# Patient Record
Sex: FEMALE | Race: WHITE | Employment: FULL TIME | URBAN - METROPOLITAN AREA
[De-identification: names, ages, dates, MRNs, and addresses within clinical notes are randomized per-mention and may not be internally consistent; named-entity substitution may affect disease eponyms.]

---
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
## 2023-01-12 ENCOUNTER — NURSE ONLY (OUTPATIENT)
Dept: FAMILY MEDICINE CLINIC | Age: 18
End: 2023-01-12

## 2023-01-12 DIAGNOSIS — Z30.42 ENCOUNTER FOR SURVEILLANCE OF INJECTABLE CONTRACEPTIVE: Primary | ICD-10-CM

## 2023-01-12 LAB
CONTROL: POSITIVE
PREGNANCY TEST URINE, POC: NEGATIVE

## 2023-01-12 RX ORDER — MEDROXYPROGESTERONE ACETATE 150 MG/ML
150 INJECTION, SUSPENSION INTRAMUSCULAR ONCE
Status: COMPLETED | OUTPATIENT
Start: 2023-01-12 | End: 2023-01-12

## 2023-01-12 RX ADMIN — MEDROXYPROGESTERONE ACETATE 150 MG: 150 INJECTION, SUSPENSION INTRAMUSCULAR at 11:01

## 2023-01-12 NOTE — PROGRESS NOTES
After obtaining consent, and per orders of Dima Delacruz PAC, injection of [id number] mg given in Right upper quad. gluteus by Jennifer Mejía MA. Patient instructed to remain in clinic for 20 minutes afterwards, and to report any adverse reaction to me immediately.

## 2023-05-21 RX ORDER — ESCITALOPRAM OXALATE 10 MG/1
TABLET ORAL
Qty: 135 TABLET | Refills: 0 | Status: SHIPPED | OUTPATIENT
Start: 2023-05-21

## 2023-07-27 ENCOUNTER — OFFICE VISIT (OUTPATIENT)
Dept: FAMILY MEDICINE CLINIC | Age: 18
End: 2023-07-27

## 2023-07-27 VITALS
BODY MASS INDEX: 27.74 KG/M2 | TEMPERATURE: 97.2 F | DIASTOLIC BLOOD PRESSURE: 82 MMHG | HEART RATE: 114 BPM | WEIGHT: 183 LBS | SYSTOLIC BLOOD PRESSURE: 122 MMHG | OXYGEN SATURATION: 98 % | HEIGHT: 68 IN

## 2023-07-27 DIAGNOSIS — K62.5 RECTAL BLEEDING: ICD-10-CM

## 2023-07-27 DIAGNOSIS — F12.10 CANNABIS ABUSE, DAILY USE: ICD-10-CM

## 2023-07-27 DIAGNOSIS — R10.13 EPIGASTRIC PAIN: ICD-10-CM

## 2023-07-27 DIAGNOSIS — F41.9 ANXIETY AND DEPRESSION: Primary | ICD-10-CM

## 2023-07-27 DIAGNOSIS — F32.A ANXIETY AND DEPRESSION: Primary | ICD-10-CM

## 2023-07-27 PROCEDURE — 99213 OFFICE O/P EST LOW 20 MIN: CPT | Performed by: PHYSICIAN ASSISTANT

## 2023-07-27 RX ORDER — OMEPRAZOLE 20 MG/1
20 CAPSULE, DELAYED RELEASE ORAL DAILY
COMMUNITY

## 2023-07-27 RX ORDER — FLUOXETINE HYDROCHLORIDE 20 MG/1
20 CAPSULE ORAL DAILY
Qty: 30 CAPSULE | Refills: 1 | Status: SHIPPED | OUTPATIENT
Start: 2023-07-27

## 2023-07-27 SDOH — ECONOMIC STABILITY: FOOD INSECURITY: WITHIN THE PAST 12 MONTHS, YOU WORRIED THAT YOUR FOOD WOULD RUN OUT BEFORE YOU GOT MONEY TO BUY MORE.: NEVER TRUE

## 2023-07-27 SDOH — ECONOMIC STABILITY: HOUSING INSECURITY
IN THE LAST 12 MONTHS, WAS THERE A TIME WHEN YOU DID NOT HAVE A STEADY PLACE TO SLEEP OR SLEPT IN A SHELTER (INCLUDING NOW)?: NO

## 2023-07-27 SDOH — ECONOMIC STABILITY: FOOD INSECURITY: WITHIN THE PAST 12 MONTHS, THE FOOD YOU BOUGHT JUST DIDN'T LAST AND YOU DIDN'T HAVE MONEY TO GET MORE.: NEVER TRUE

## 2023-07-27 SDOH — ECONOMIC STABILITY: INCOME INSECURITY: HOW HARD IS IT FOR YOU TO PAY FOR THE VERY BASICS LIKE FOOD, HOUSING, MEDICAL CARE, AND HEATING?: NOT HARD AT ALL

## 2023-07-27 ASSESSMENT — ENCOUNTER SYMPTOMS
DIARRHEA: 0
COLOR CHANGE: 0
CONSTIPATION: 0
SHORTNESS OF BREATH: 0
NAUSEA: 0
ABDOMINAL PAIN: 1
BLOOD IN STOOL: 1
VOMITING: 0

## 2023-07-27 ASSESSMENT — PATIENT HEALTH QUESTIONNAIRE - PHQ9
SUM OF ALL RESPONSES TO PHQ QUESTIONS 1-9: 0
SUM OF ALL RESPONSES TO PHQ QUESTIONS 1-9: 0
SUM OF ALL RESPONSES TO PHQ9 QUESTIONS 1 & 2: 0
2. FEELING DOWN, DEPRESSED OR HOPELESS: 0
SUM OF ALL RESPONSES TO PHQ QUESTIONS 1-9: 0
1. LITTLE INTEREST OR PLEASURE IN DOING THINGS: 0
SUM OF ALL RESPONSES TO PHQ QUESTIONS 1-9: 0

## 2023-07-27 NOTE — PROGRESS NOTES
Visit Information    Have you changed or started any medications since your last visit including any over-the-counter medicines, vitamins, or herbal medicines? no   Are you having any side effects from any of your medications? -  no  Have you stopped taking any of your medications? Is so, why? -  no    Have you seen any other physician or provider since your last visit? No  Have you had any other diagnostic tests since your last visit? No  Have you been seen in the emergency room and/or had an admission to a hospital since we last saw you? No  Have you had your routine dental cleaning in the past 6 months? no    Have you activated your [employer] account? If not, what are your barriers?  Yes     Patient Care Team:  Radha Huddleston PA-C as PCP - General (Physician Assistant)  Radha Huddleston PA-C as PCP - Empaneled Provider    Medical History Review  Past Medical, Family, and Social History reviewed and  contribute to the patient presenting condition    Health Maintenance   Topic Date Due    COVID-19 Vaccine (1) Never done    Pneumococcal 0-64 years Vaccine (1 - PCV) 06/05/2009    HIV screen  Never done    [place] screen  Never done    HPV vaccine (2 - 3-dose series) 02/04/2020    Hepatitis C screen  Never done    Flu vaccine (1) 08/01/2023    Depression Screen  11/01/2023    DTaP/Tdap/Td vaccine (7 - Td or Tdap) 10/06/2025    Hib vaccine  Completed    Varicella vaccine  Completed    Meningococcal (ACWY) vaccine  Completed    Hepatitis A vaccine  Aged Out    Hepatitis B vaccine  Discontinued    Polio vaccine  Discontinued    Measles,Mumps,Rubella (MMR) vaccine  Discontinued

## 2023-07-27 NOTE — PROGRESS NOTES
[address] [facility]  [address] [facility] [address] [facility] [address]  Dept: [phone]  Dept Fax: [phone]    Junior Cruz is a 19 y.o. female who presents today for her medical conditions/complaintsas noted below. Junior Cruz is c/o of   Chief Complaint   Patient presents with    Abdominal Pain     X 2 weeks   Epigastric area- becomes more so after she eats  nausea    Anxiety     worried thoughts       HPI:     HPI    Patient is here with her mom. She states 2 weeks of abdominal pain, nausea off and on and history of anxiety. Mom placed her on PPI-omeprazole the last 1 week. She also has tried adjusting her diet and seen improvement on a bland diet and less intake. Patient has had some bright red blood in her stool. More firm in the AM stool softer in the afternoon. History of constipation since she was a child. She states toilet water will turn red off and on. No bleeding today with BM. Patient also reports she stopped lexapro 1 month ago. She states she did a short taper as she was concerned about weight gain. Patient states she was not noticing that much effect on the medication. She has used Zoloft in the past and also not had great success. She has not had GeneSight testing done at this point. Mom reports they are currently without insurance as she is in between jobs. Will be starting a new job soon.     Hemoglobin A1C (%)   Date Value   01/17/2020 5.3   04/04/2017 5.0             ( goal A1Cis < 7)   No components found for: LABMICR  LDL Cholesterol (mg/dL)   Date Value   01/17/2020 110       (goal LDL is <100)   AST (U/L)   Date Value   01/17/2020 18     ALT (U/L)   Date Value   01/17/2020 11     BUN (mg/dL)   Date Value   12/14/2021 13     BP Readings from Last 3 Encounters:   07/27/23 122/82   11/01/22 110/68   12/08/21 112/70          (goal 120/80)    Past Medical History:   Diagnosis Date    Allergic rhinitis     Anxiety     AR (allergic

## 2023-09-24 DIAGNOSIS — F41.9 ANXIETY AND DEPRESSION: ICD-10-CM

## 2023-09-24 DIAGNOSIS — F32.A ANXIETY AND DEPRESSION: ICD-10-CM

## 2023-09-24 RX ORDER — FLUOXETINE HYDROCHLORIDE 20 MG/1
20 CAPSULE ORAL DAILY
Qty: 30 CAPSULE | Refills: 1 | Status: SHIPPED | OUTPATIENT
Start: 2023-09-24